# Patient Record
Sex: FEMALE | Race: WHITE | Employment: FULL TIME | URBAN - NONMETROPOLITAN AREA
[De-identification: names, ages, dates, MRNs, and addresses within clinical notes are randomized per-mention and may not be internally consistent; named-entity substitution may affect disease eponyms.]

---

## 2018-01-31 ENCOUNTER — TELEPHONE (OUTPATIENT)
Dept: FAMILY MEDICINE CLINIC | Age: 43
End: 2018-01-31

## 2018-01-31 NOTE — TELEPHONE ENCOUNTER
1. Appt time and date. (give directions)       2/2/18 at 0800    2. Arrive 15 min before appt. 3. Please bring all medications to appt. 4. Bring immunization record. (if no record, which immunizations have you had and where?)      Pt informed and verbalized understanding.

## 2018-02-02 ENCOUNTER — OFFICE VISIT (OUTPATIENT)
Dept: FAMILY MEDICINE CLINIC | Age: 43
End: 2018-02-02
Payer: COMMERCIAL

## 2018-02-02 VITALS
DIASTOLIC BLOOD PRESSURE: 74 MMHG | RESPIRATION RATE: 12 BRPM | SYSTOLIC BLOOD PRESSURE: 126 MMHG | WEIGHT: 129.8 LBS | HEIGHT: 62 IN | HEART RATE: 64 BPM | BODY MASS INDEX: 23.89 KG/M2 | TEMPERATURE: 97.9 F

## 2018-02-02 DIAGNOSIS — G44.209 TENSION HEADACHE: ICD-10-CM

## 2018-02-02 PROCEDURE — 99203 OFFICE O/P NEW LOW 30 MIN: CPT | Performed by: NURSE PRACTITIONER

## 2018-02-02 RX ORDER — AMITRIPTYLINE HYDROCHLORIDE 10 MG/1
10 TABLET, FILM COATED ORAL NIGHTLY
Qty: 30 TABLET | Refills: 3 | Status: SHIPPED | OUTPATIENT
Start: 2018-02-02 | End: 2019-07-12 | Stop reason: ALTCHOICE

## 2018-02-02 ASSESSMENT — PATIENT HEALTH QUESTIONNAIRE - PHQ9
1. LITTLE INTEREST OR PLEASURE IN DOING THINGS: 0
SUM OF ALL RESPONSES TO PHQ9 QUESTIONS 1 & 2: 0
2. FEELING DOWN, DEPRESSED OR HOPELESS: 0
SUM OF ALL RESPONSES TO PHQ QUESTIONS 1-9: 0

## 2018-02-02 NOTE — PROGRESS NOTES
Nausea, Vomiting, Diarrhea, Constipation, Heartburn, Blood in stool  Genitourinary:  Difficulty or painful urination, Flank pain, Change in frequency, Urgency  Skin:  Color change, Rash, Itching, Wound  Psychiatric:  Hallucinations, Anxiety, Depression, Suicidal ideation  Hematological:  Enlarged glands, Easy bleeding, Easily bruising  Musculoskeletal:  Joint pain, Back pain, Gait problems, Joint swelling, Myalgias  Neurological:  Dizziness, Headaches, Presyncope, Numbness, Seizures, Tremors  Allergy:  Environmental allergies, Food allergies  Endocrine:  Heat Intolerance, Cold Intolerance, Polydipsia, Polyphagia, Polyuria      PHYSICAL EXAM:  Vitals:    02/02/18 0804   BP: 126/74   Pulse: 64   Resp: 12   Temp: 97.9 °F (36.6 °C)   TempSrc: Oral   Weight: 129 lb 12.8 oz (58.9 kg)   Height: 5' 2\" (1.575 m)     Body mass index is 23.74 kg/m². VS Reviewed  General Appearance: A&O x 3, No acute distress,well developed and well- nourished  Head: normocephalic and atraumatic  Eyes: pupils equal, round, and reactive to light, extraocular eye movements intact, conjunctivae and eye lids without erythema  Neck: supple and non-tender without mass, no thyromegaly or thyroid nodules, no cervical lymphadenopathy  Pulmonary/Chest: clear to auscultation bilaterally- no wheezes, rales or rhonchi, normal air movement, no respiratory distress or retractions  Cardiovascular: S1 and S2 auscultated w/ RRR. No murmurs, rubs, clicks, or gallops, distal pulses intact. Abdomen: soft, non-tender, non-distended, bowl sounds physiologic,  no rebound or guarding, no masses or hernias noted. Liver and spleen without enlargement. Extremities: no cyanosis, clubbing or edema of the lower extremities. +2 PT/DP bilaterally. Musculoskeletal: No joint swelling or gross deformity   Neuro:  Alert, 5/5 strength globally and symmetrically  Psych: Affect appropriate.   Mood normal. Thought process is normal without evidence of depression or

## 2018-03-16 ENCOUNTER — OFFICE VISIT (OUTPATIENT)
Dept: FAMILY MEDICINE CLINIC | Age: 43
End: 2018-03-16
Payer: COMMERCIAL

## 2018-03-16 VITALS
DIASTOLIC BLOOD PRESSURE: 64 MMHG | RESPIRATION RATE: 10 BRPM | SYSTOLIC BLOOD PRESSURE: 110 MMHG | HEART RATE: 84 BPM | WEIGHT: 125 LBS | BODY MASS INDEX: 23 KG/M2 | TEMPERATURE: 98.2 F | HEIGHT: 62 IN

## 2018-03-16 DIAGNOSIS — G43.009 MIGRAINE WITHOUT AURA AND WITHOUT STATUS MIGRAINOSUS, NOT INTRACTABLE: Primary | ICD-10-CM

## 2018-03-16 DIAGNOSIS — G44.209 TENSION HEADACHE: ICD-10-CM

## 2018-03-16 PROCEDURE — 99213 OFFICE O/P EST LOW 20 MIN: CPT | Performed by: NURSE PRACTITIONER

## 2018-03-16 RX ORDER — CLONIDINE HYDROCHLORIDE 0.1 MG/1
0.1 TABLET ORAL
Qty: 60 TABLET | Refills: 1 | Status: SHIPPED | OUTPATIENT
Start: 2018-03-16 | End: 2019-07-12 | Stop reason: SDUPTHER

## 2018-03-16 RX ORDER — PROPRANOLOL HCL 60 MG
60 CAPSULE, EXTENDED RELEASE 24HR ORAL DAILY
Qty: 30 CAPSULE | Refills: 3 | Status: SHIPPED | OUTPATIENT
Start: 2018-03-16 | End: 2018-05-18 | Stop reason: SDUPTHER

## 2018-03-16 NOTE — PROGRESS NOTES
Chief Complaint   Patient presents with    Follow-up     Pt is here for a 6 week f/u for headaches. She states that she is doing the same. She feels sluggish and tired when she takes the medication. History obtained from chart review. SUBJECTIVE:  Kasia Malagon is a 44 y.o. female that presents today for follow up headaches      Reports that she was taking the Elavil for headache prophylaxis, and she felt like groggy, in a fog. She would have mental pauses where she was all dressed and ready to go, and couldn't think what she was doing. She had 2 full blown migraines in the last 6 weeks. The headaches are usually triggered by stress/anxiety. Will usually get numbness/tingling and then the full blown migraine. She has had 3-4 tension headaches in the last 6 weeks. Tension headaches are usually managed by motrin. The tension headaches are generally manageable. She had taken clonidine 0.1 mg before for about 1 year. The clonidine would usually abort the full blown migraine. Headaches    HPI:  Gets the HA since 2005. Stress usually does trigger them. Description of headaches - start at the base of her neck, usually the left side and will go up and over her head  Frequency of Headaches - once a week  Level of disability - if she doesn't take the Excedrin migraine, then she gets numb tongue, tingling in her right hand, bad balance, blindness in the left eye    Currently on prophylactic therapy? No  Taking abortive therapy? Yes - 2 Excedrin migraine, water    Associated Aura? No  Photophobia, Phonophobia? Yes -  Nausea or Vomiting? Yes - If she doesn't take the Excedrin migraine  Recent change in Headaches? No  Do headaches awaken her from sleep?   No    Age/Gender Health Maintenance    Lipid -  needs  DM Screen - needs  Colon Cancer Screening - n/a  Lung Cancer Screening (Age 54 to [de-identified] with 30 pack year hx, current smoker or quit within past 15 years) - n/a    Tetanus - needs  Influenza Vaccine - needs  Pneumonia Vaccine - n/a  Zostavax - n/a     Breast Cancer Screening - n/a  Cervical Cancer Screening - Francisco Zarate CNP, PAP 2015  Osteoporosis Screening - n/a  Chlamydia Screen - n/a    Falls screening - n/a    Current Outpatient Prescriptions   Medication Sig Dispense Refill    cloNIDine (CATAPRES) 0.1 MG tablet Take 1 tablet by mouth once as needed for Other (migraine) 60 tablet 1    propranolol (INDERAL LA) 60 MG extended release capsule Take 1 capsule by mouth daily 30 capsule 3    amitriptyline (ELAVIL) 10 MG tablet Take 1 tablet by mouth nightly 30 tablet 3     No current facility-administered medications for this visit. Orders Placed This Encounter   Medications    cloNIDine (CATAPRES) 0.1 MG tablet     Sig: Take 1 tablet by mouth once as needed for Other (migraine)     Dispense:  60 tablet     Refill:  1    propranolol (INDERAL LA) 60 MG extended release capsule     Sig: Take 1 capsule by mouth daily     Dispense:  30 capsule     Refill:  3         All medications reviewed and reconciled, including OTC and herbal medications. Updated list given to patient. Patient Active Problem List   Diagnosis    Allergic rhinitis    Tension headache       Past Medical History:   Diagnosis Date    Tension headache 2/2/2018       Past Surgical History:   Procedure Laterality Date    MOUTH SURGERY  6/2009    TUBAL LIGATION  2000       No Known Allergies    Social History     Social History    Marital status:      Spouse name: N/A    Number of children: N/A    Years of education: N/A     Occupational History    Not on file.      Social History Main Topics    Smoking status: Former Smoker     Quit date: 6/28/2009    Smokeless tobacco: Never Used    Alcohol use Yes      Comment: socially     Drug use: No    Sexual activity: Yes     Partners: Male     Birth control/ protection: Surgical     Other Topics Concern    Not on file     Social History Narrative    No narrative on file Family History   Problem Relation Age of Onset    Cancer Mother      breast cancer    Diabetes Mother      type 2    Diabetes Father      type 2    Cancer Sister      breast cancer         I have reviewed the patient's past medical history, past surgical history, allergies, medications, social and family history and I have made updates where appropriate. Review of Systems  Positive responses are highlighted in bold    Constitutional:  Fever, Chills, Night Sweats, Fatigue, Unexpected changes in weight  Eyes:  Eye discharge, Eye pain, Eye redness, Visual disturbances   HENT:  Ear pain, Tinnitus, Nosebleeds, Trouble swallowing, Hearing loss, Sore throat  Cardiovascular:  Chest Pain, Palpitations, Orthopnea, Paroxysmal Nocturnal Dyspnea  Respiratory:  Cough, Wheezing, Shortness of breath, Chest tightness, Apnea  Gastrointestinal:  Nausea, Vomiting, Diarrhea, Constipation, Heartburn, Blood in stool  Genitourinary:  Difficulty or painful urination, Flank pain, Change in frequency, Urgency  Skin:  Color change, Rash, Itching, Wound  Psychiatric:  Hallucinations, Anxiety, Depression, Suicidal ideation  Hematological:  Enlarged glands, Easy bleeding, Easily bruising  Musculoskeletal:  Joint pain, Back pain, Gait problems, Joint swelling, Myalgias  Neurological:  Dizziness, Headaches, Presyncope, Numbness, Seizures, Tremors  Allergy:  Environmental allergies, Food allergies  Endocrine:  Heat Intolerance, Cold Intolerance, Polydipsia, Polyphagia, Polyuria      PHYSICAL EXAM:  Vitals:    03/16/18 1009   BP: 110/64   Site: Right Arm   Position: Sitting   Pulse: 84   Resp: 10   Temp: 98.2 °F (36.8 °C)   TempSrc: Oral   Weight: 125 lb (56.7 kg)   Height: 5' 2\" (1.575 m)     Body mass index is 22.86 kg/m².          VS Reviewed  General Appearance: A&O x 3, No acute distress,well developed and well- nourished  Head: normocephalic and atraumatic  Eyes: pupils equal, round, and reactive to light, extraocular eye

## 2018-03-16 NOTE — PROGRESS NOTES
Visit Information    Have you changed or started any medications since your last visit including any over-the-counter medicines, vitamins, or herbal medicines? no   Are you having any side effects from any of your medications? -  no  Have you stopped taking any of your medications? Is so, why? -  Yes- see med list    Have you seen any other physician or provider since your last visit? No  Have you had any other diagnostic tests since your last visit? No  Have you been seen in the emergency room and/or had an admission to a hospital since we last saw you? No  Have you had your routine dental cleaning in the past 6 months? yes - rputine    Have you activated your [employer] account? If not, what are your barriers?  Yes     Patient Care Team:  Karuna Schneider CNP as PCP - General (Family Medicine)  Lyanne Lefort, MD as Consulting Physician (Otolaryngology)    Medical History Review  Past Medical, Family, and Social History reviewed and does contribute to the patient presenting condition    Health Maintenance   Topic Date Due    HIV screen  01/08/1991    DTaP/Tdap/Td vaccine (1 - Tdap) 01/08/1995    Cervical cancer screen  01/08/1997    Lipid screen  01/08/2016    Flu vaccine (1) 09/01/2017

## 2018-05-18 ENCOUNTER — OFFICE VISIT (OUTPATIENT)
Dept: FAMILY MEDICINE CLINIC | Age: 43
End: 2018-05-18
Payer: COMMERCIAL

## 2018-05-18 VITALS
BODY MASS INDEX: 22.82 KG/M2 | DIASTOLIC BLOOD PRESSURE: 72 MMHG | TEMPERATURE: 97.8 F | SYSTOLIC BLOOD PRESSURE: 94 MMHG | WEIGHT: 124 LBS | HEIGHT: 62 IN | HEART RATE: 84 BPM | RESPIRATION RATE: 14 BRPM

## 2018-05-18 DIAGNOSIS — G43.009 MIGRAINE WITHOUT AURA AND WITHOUT STATUS MIGRAINOSUS, NOT INTRACTABLE: ICD-10-CM

## 2018-05-18 PROCEDURE — 99213 OFFICE O/P EST LOW 20 MIN: CPT | Performed by: NURSE PRACTITIONER

## 2018-05-18 RX ORDER — PROPRANOLOL HCL 60 MG
60 CAPSULE, EXTENDED RELEASE 24HR ORAL DAILY
Qty: 90 CAPSULE | Refills: 3 | Status: SHIPPED | OUTPATIENT
Start: 2018-05-18 | End: 2019-07-12 | Stop reason: SDUPTHER

## 2019-07-12 ENCOUNTER — OFFICE VISIT (OUTPATIENT)
Dept: FAMILY MEDICINE CLINIC | Age: 44
End: 2019-07-12
Payer: COMMERCIAL

## 2019-07-12 VITALS
BODY MASS INDEX: 22.47 KG/M2 | SYSTOLIC BLOOD PRESSURE: 118 MMHG | WEIGHT: 119 LBS | HEART RATE: 88 BPM | DIASTOLIC BLOOD PRESSURE: 74 MMHG | TEMPERATURE: 97.7 F | RESPIRATION RATE: 14 BRPM | HEIGHT: 61 IN

## 2019-07-12 DIAGNOSIS — G43.109 MIGRAINE WITH AURA AND WITHOUT STATUS MIGRAINOSUS, NOT INTRACTABLE: ICD-10-CM

## 2019-07-12 DIAGNOSIS — F41.9 ANXIETY: Primary | ICD-10-CM

## 2019-07-12 PROCEDURE — 99214 OFFICE O/P EST MOD 30 MIN: CPT | Performed by: NURSE PRACTITIONER

## 2019-07-12 RX ORDER — CLONAZEPAM 1 MG/1
TABLET ORAL
Qty: 60 TABLET | Refills: 0 | Status: SHIPPED | OUTPATIENT
Start: 2019-07-12 | End: 2022-03-18 | Stop reason: ALTCHOICE

## 2019-07-12 RX ORDER — CLONIDINE HYDROCHLORIDE 0.1 MG/1
0.1 TABLET ORAL
Qty: 60 TABLET | Refills: 1 | Status: SHIPPED | OUTPATIENT
Start: 2019-07-12 | End: 2022-03-18 | Stop reason: SDUPTHER

## 2019-07-12 RX ORDER — PROPRANOLOL HCL 60 MG
60 CAPSULE, EXTENDED RELEASE 24HR ORAL DAILY
Qty: 90 CAPSULE | Refills: 3 | Status: SHIPPED | OUTPATIENT
Start: 2019-07-12 | End: 2021-05-31

## 2019-07-12 ASSESSMENT — PATIENT HEALTH QUESTIONNAIRE - PHQ9
SUM OF ALL RESPONSES TO PHQ QUESTIONS 1-9: 0
SUM OF ALL RESPONSES TO PHQ9 QUESTIONS 1 & 2: 0
1. LITTLE INTEREST OR PLEASURE IN DOING THINGS: 0
2. FEELING DOWN, DEPRESSED OR HOPELESS: 0
SUM OF ALL RESPONSES TO PHQ QUESTIONS 1-9: 0

## 2019-07-12 NOTE — PROGRESS NOTES
Chief Complaint   Patient presents with    Follow-up     headaches, improved       History obtained from chart review. SUBJECTIVE:  Marj Shore is a 38 y.o. female that presents today for follow up headaches    Headaches are doing well. Headaches    HPI:    Description of headaches - start at the base of her neck, usually the left side and will go up and over her head  Frequency of Headaches - once every 1-2 months  Level of disability - if she doesn't take the Excedrin migraine, then she gets numb tongue, tingling in her right hand, bad balance, blindness in the left eye    Currently on prophylactic therapy? Propranolol  Taking abortive therapy? Yes - clonidine    Associated Aura? Yes, sees lights and face  Photophobia, Phonophobia? Yes -  Nausea or Vomiting? Yes - If she doesn't take the Excedrin migraine  Recent change in Headaches? No  Do headaches awaken her from sleep? No    Anxiety     HPI:    Inciting events or triggers for anxiety - work, works for [employer]) and she has been doing investigations and crazy busy and stressed and can't sleep for the past week    Frequency of anxiety - daily  Panic attacks? Yes - daily  Symptoms of panic attacks -  chest pain, difficulty concentrating, racing thoughts, shortness of breath and sweating  Sleep Disturbances? Yes - falling asleep  Impaired concentration? Yes  Substance abuse? No  Suicidal/Homicidal Ideation?  No    Age/Gender Health Maintenance    Lipid - 40  DM Screen - 40  Colon Cancer Screening - 48  Lung Cancer Screening (Age 54 to [de-identified] with 30 pack year hx, current smoker or quit within past 15 years) - n/a    Tetanus - needs  Influenza Vaccine - needs  Pneumonia Vaccine - n/a  Zostavax - n/a     Breast Cancer Screening - 50  Cervical Cancer Screening - Johnny Sparks CNP, PAP 2015  Osteoporosis Screening - n/a  Chlamydia Screen - n/a    Falls screening - n/a    Current Outpatient Medications   Medication Sig Dispense Refill    GINKGO BILOBA PO Take by mouth      cloNIDine (CATAPRES) 0.1 MG tablet Take 1 tablet by mouth once as needed for Other (migraine) 60 tablet 1    propranolol (INDERAL LA) 60 MG extended release capsule Take 1 capsule by mouth daily 90 capsule 3    clonazePAM (KLONOPIN) 1 MG tablet Take 1/2 tablet to 1 full tablet TID prn for anxiety/panic attacks and/or sleep 60 tablet 0     No current facility-administered medications for this visit. Orders Placed This Encounter   Medications    cloNIDine (CATAPRES) 0.1 MG tablet     Sig: Take 1 tablet by mouth once as needed for Other (migraine)     Dispense:  60 tablet     Refill:  1    propranolol (INDERAL LA) 60 MG extended release capsule     Sig: Take 1 capsule by mouth daily     Dispense:  90 capsule     Refill:  3    clonazePAM (KLONOPIN) 1 MG tablet     Sig: Take 1/2 tablet to 1 full tablet TID prn for anxiety/panic attacks and/or sleep     Dispense:  60 tablet     Refill:  0         All medications reviewed and reconciled, including OTC and herbal medications. Updated list given to patient.        Patient Active Problem List   Diagnosis    Allergic rhinitis    Migraine with aura and without status migrainosus, not intractable    Anxiety       Past Medical History:   Diagnosis Date    Tension headache 2/2/2018       Past Surgical History:   Procedure Laterality Date    MOUTH SURGERY  6/2009    TUBAL LIGATION  2000       No Known Allergies    Social History     Socioeconomic History    Marital status:      Spouse name: Not on file    Number of children: Not on file    Years of education: Not on file    Highest education level: Not on file   Occupational History    Not on file   Social Needs    Financial resource strain: Not on file    Food insecurity:     Worry: Not on file     Inability: Not on file    Transportation needs:     Medical: Not on file     Non-medical: Not on file   Tobacco Use    Smoking status: Former Smoker     Last attempt to quit: 6/28/2009 Years since quitting: 10.0    Smokeless tobacco: Never Used   Substance and Sexual Activity    Alcohol use: Yes     Comment: socially     Drug use: No    Sexual activity: Yes     Partners: Male     Birth control/protection: Surgical   Lifestyle    Physical activity:     Days per week: Not on file     Minutes per session: Not on file    Stress: Not on file   Relationships    Social connections:     Talks on phone: Not on file     Gets together: Not on file     Attends Orthodox service: Not on file     Active member of club or organization: Not on file     Attends meetings of clubs or organizations: Not on file     Relationship status: Not on file    Intimate partner violence:     Fear of current or ex partner: Not on file     Emotionally abused: Not on file     Physically abused: Not on file     Forced sexual activity: Not on file   Other Topics Concern    Not on file   Social History Narrative    Not on file       Family History   Problem Relation Age of Onset    Cancer Mother         breast cancer    Diabetes Mother         type 2    Diabetes Father         type 2    Cancer Sister         breast cancer         I have reviewed the patient's past medical history, past surgical history, allergies, medications, social and family history and I have made updates where appropriate.       Review of Systems  Positive responses are highlighted in bold    Constitutional:  Fever, Chills, Night Sweats, Fatigue, Unexpected changes in weight  Eyes:  Eye discharge, Eye pain, Eye redness, Visual disturbances   HENT:  Ear pain, Tinnitus, Nosebleeds, Trouble swallowing, Hearing loss, Sore throat  Cardiovascular:  Chest Pain, Palpitations, Orthopnea, Paroxysmal Nocturnal Dyspnea  Respiratory:  Cough, Wheezing, Shortness of breath, Chest tightness, Apnea  Gastrointestinal:  Nausea, Vomiting, Diarrhea, Constipation, Heartburn, Blood in stool  Genitourinary:  Difficulty or painful urination, Flank pain, Change in

## 2020-02-11 ENCOUNTER — TELEPHONE (OUTPATIENT)
Dept: FAMILY MEDICINE CLINIC | Age: 45
End: 2020-02-11

## 2020-02-11 RX ORDER — OSELTAMIVIR PHOSPHATE 75 MG/1
75 CAPSULE ORAL DAILY
Qty: 10 CAPSULE | Refills: 0 | Status: SHIPPED | OUTPATIENT
Start: 2020-02-11 | End: 2020-02-21

## 2020-02-13 ENCOUNTER — TELEPHONE (OUTPATIENT)
Dept: FAMILY MEDICINE CLINIC | Age: 45
End: 2020-02-13

## 2020-03-12 ENCOUNTER — TELEPHONE (OUTPATIENT)
Dept: FAMILY MEDICINE CLINIC | Age: 45
End: 2020-03-12

## 2020-03-12 ENCOUNTER — HOSPITAL ENCOUNTER (EMERGENCY)
Age: 45
Discharge: HOME | End: 2020-03-12
Payer: COMMERCIAL

## 2020-03-12 VITALS
HEIGHT: 61 IN | SYSTOLIC BLOOD PRESSURE: 126 MMHG | HEART RATE: 88 BPM | TEMPERATURE: 98.6 F | DIASTOLIC BLOOD PRESSURE: 95 MMHG | RESPIRATION RATE: 16 BRPM | OXYGEN SATURATION: 99 % | BODY MASS INDEX: 22.48 KG/M2

## 2020-03-12 LAB
FLU A ANTIGEN: NEGATIVE
FLU B ANTIGEN: NEGATIVE
GROUP A STREP CULTURE, REFLEX: NEGATIVE
REFLEX THROAT C + S: NORMAL

## 2020-03-12 PROCEDURE — 87804 INFLUENZA ASSAY W/OPTIC: CPT

## 2020-03-12 PROCEDURE — 99283 EMERGENCY DEPT VISIT LOW MDM: CPT

## 2020-03-12 PROCEDURE — 87880 STREP A ASSAY W/OPTIC: CPT

## 2020-03-12 PROCEDURE — 87070 CULTURE OTHR SPECIMN AEROBIC: CPT

## 2020-03-12 ASSESSMENT — ENCOUNTER SYMPTOMS
BACK PAIN: 0
TROUBLE SWALLOWING: 0
SHORTNESS OF BREATH: 1
RHINORRHEA: 1
NAUSEA: 0
SORE THROAT: 1
COUGH: 1
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
## 2020-03-14 LAB — THROAT/NOSE CULTURE: NORMAL

## 2021-02-03 ENCOUNTER — HOSPITAL ENCOUNTER (EMERGENCY)
Age: 46
Discharge: HOME | End: 2021-02-03
Payer: COMMERCIAL

## 2021-02-03 VITALS
HEART RATE: 99 BPM | SYSTOLIC BLOOD PRESSURE: 145 MMHG | TEMPERATURE: 98.7 F | OXYGEN SATURATION: 100 % | DIASTOLIC BLOOD PRESSURE: 95 MMHG | RESPIRATION RATE: 20 BRPM

## 2021-02-03 DIAGNOSIS — S09.90XA CLOSED HEAD INJURY, INITIAL ENCOUNTER: ICD-10-CM

## 2021-02-03 DIAGNOSIS — S00.411A EAR ABRASION, RIGHT, INITIAL ENCOUNTER: ICD-10-CM
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
## 2021-02-03 PROCEDURE — 6370000000 HC RX 637 (ALT 250 FOR IP): Performed by: EMERGENCY MEDICINE

## 2021-02-03 PROCEDURE — 99284 EMERGENCY DEPT VISIT MOD MDM: CPT

## 2021-02-03 RX ORDER — ACETAMINOPHEN 325 MG/1
650 TABLET ORAL ONCE
Status: COMPLETED | OUTPATIENT
Start: 2021-02-03 | End: 2021-02-03

## 2021-02-03 RX ADMIN — ACETAMINOPHEN 650 MG: 325 TABLET ORAL at 15:37
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
## 2021-05-31 ENCOUNTER — HOSPITAL ENCOUNTER (EMERGENCY)
Age: 46
Discharge: HOME | End: 2021-05-31
Payer: COMMERCIAL

## 2021-05-31 VITALS
OXYGEN SATURATION: 100 % | TEMPERATURE: 98 F | HEART RATE: 83 BPM | SYSTOLIC BLOOD PRESSURE: 171 MMHG | DIASTOLIC BLOOD PRESSURE: 75 MMHG | RESPIRATION RATE: 16 BRPM | BODY MASS INDEX: 22.08 KG/M2 | WEIGHT: 120 LBS | HEIGHT: 62 IN

## 2021-05-31 DIAGNOSIS — Z20.822 ENCOUNTER FOR LABORATORY TESTING FOR COVID-19 VIRUS: ICD-10-CM

## 2021-05-31 DIAGNOSIS — J06.9 VIRAL URI: Primary | ICD-10-CM

## 2021-05-31 DIAGNOSIS — R43.2 ALTERED TASTE: ICD-10-CM

## 2021-05-31 DIAGNOSIS — R43.9 SENSE OF SMELL ALTERED: ICD-10-CM

## 2021-05-31 LAB — SARS-COV-2, NAA: NOT DETECTED

## 2021-05-31 PROCEDURE — 99213 OFFICE O/P EST LOW 20 MIN: CPT

## 2021-05-31 PROCEDURE — 99213 OFFICE O/P EST LOW 20 MIN: CPT | Performed by: NURSE PRACTITIONER

## 2021-05-31 PROCEDURE — 87635 SARS-COV-2 COVID-19 AMP PRB: CPT

## 2021-05-31 ASSESSMENT — PAIN SCALES - GENERAL: PAINLEVEL_OUTOF10: 5

## 2021-05-31 ASSESSMENT — ENCOUNTER SYMPTOMS
EYE REDNESS: 0
NAUSEA: 0
EYE DISCHARGE: 0
COUGH: 0
DIARRHEA: 0
SORE THROAT: 0
TROUBLE SWALLOWING: 0
RHINORRHEA: 1
SHORTNESS OF BREATH: 0
VOMITING: 0

## 2021-05-31 ASSESSMENT — PAIN DESCRIPTION - PROGRESSION: CLINICAL_PROGRESSION: GRADUALLY WORSENING

## 2021-05-31 ASSESSMENT — PAIN DESCRIPTION - DESCRIPTORS: DESCRIPTORS: ACHING

## 2021-05-31 ASSESSMENT — PAIN DESCRIPTION - LOCATION: LOCATION: GENERALIZED

## 2021-05-31 ASSESSMENT — PAIN DESCRIPTION - PAIN TYPE: TYPE: ACUTE PAIN

## 2021-05-31 ASSESSMENT — PAIN DESCRIPTION - FREQUENCY: FREQUENCY: CONTINUOUS

## 2021-05-31 ASSESSMENT — PAIN DESCRIPTION - ONSET: ONSET: ON-GOING

## 2021-05-31 NOTE — ED NOTES
Discharge instructions  reviewed with pt. Pt verbalized understanding. Pt ambulated out in stable condition.       Lloyd Boateng RN  05/31/21 2070

## 2021-05-31 NOTE — ED PROVIDER NOTES
[id number]       Chief Complaint   Patient presents with    Concern For COVID-19       Nurses Notes reviewed and I agree except as noted in the HPI. HISTORY OF PRESENT ILLNESS   Ezequiel Vu is a 40 y.o. female who presents with complaints of sinus problems. Onset of symptoms today, unchanged. Patient complains of sinus congestion. She also complains of alteration in taste and smell. No travel. Patient exposed to COVID-19 greater than 2 weeks ago. Patient is required to be tested due to symptoms prior to going to work. REVIEW OF SYSTEMS     Review of Systems   Constitutional: Negative for chills, diaphoresis, fatigue and fever. HENT: Positive for congestion and rhinorrhea. Negative for ear pain, sore throat and trouble swallowing. Eyes: Negative for discharge and redness. Respiratory: Negative for cough and shortness of breath. Cardiovascular: Negative for chest pain. Gastrointestinal: Negative for diarrhea, nausea and vomiting. Genitourinary: Negative for decreased urine volume. Musculoskeletal: Negative for neck pain and neck stiffness. Skin: Negative for rash. Neurological: Negative for headaches. Hematological: Negative for adenopathy. Psychiatric/Behavioral: Negative for sleep disturbance. PAST MEDICAL HISTORY         Diagnosis Date    Tension headache 2/2/2018       SURGICAL HISTORY     Patient  has a past surgical history that includes Tubal ligation (2000) and Mouth surgery (6/2009). CURRENT MEDICATIONS       Previous Medications    CLONAZEPAM (KLONOPIN) 1 MG TABLET    Take 1/2 tablet to 1 full tablet TID prn for anxiety/panic attacks and/or sleep    CLONIDINE (CATAPRES) 0.1 MG TABLET    Take 1 tablet by mouth once as needed for Other (migraine)    GINKGO BILOBA PO    Take by mouth       ALLERGIES     Patient is has No Known Allergies.     FAMILY HISTORY     Patient'sfamily history includes Cancer in her mother and sister; Diabetes in her father and mother. SOCIAL HISTORY     Patient  reports that she quit smoking about 11 years ago. She has never used smokeless tobacco. She reports current alcohol use. She reports that she does not use drugs. PHYSICAL EXAM     ED TRIAGE VITALS  BP: (!) 171/75, Temp: 98 °F (36.7 °C), Pulse: 83, Resp: 16, SpO2: 100 %  Physical Exam  Vitals and nursing note reviewed. Constitutional:       General: She is not in acute distress. Appearance: Normal appearance. She is well-developed. She is not ill-appearing, toxic-appearing or diaphoretic. HENT:      Head: Normocephalic and atraumatic. Right Ear: Hearing, tympanic membrane, ear canal and external ear normal. No mastoid tenderness. No hemotympanum. Tympanic membrane is not perforated, erythematous or bulging. Left Ear: Hearing, tympanic membrane, ear canal and external ear normal. No mastoid tenderness. No hemotympanum. Tympanic membrane is not perforated, erythematous or bulging. Nose: Congestion present. No rhinorrhea. Mouth/Throat:      Mouth: Mucous membranes are moist.      Pharynx: Oropharynx is clear. Uvula midline. Tonsils: No tonsillar abscesses. Eyes:      General: No scleral icterus. Conjunctiva/sclera: Conjunctivae normal.      Right eye: Right conjunctiva is not injected. No hemorrhage. Left eye: Left conjunctiva is not injected. No hemorrhage. Neck:      Thyroid: No thyromegaly. Trachea: Trachea normal.   Cardiovascular:      Rate and Rhythm: Normal rate and regular rhythm. No extrasystoles are present. Chest Wall: PMI is not displaced. Heart sounds: Normal heart sounds. No murmur heard. No friction rub. No gallop. Pulmonary:      Effort: Pulmonary effort is normal. No respiratory distress. Breath sounds: Normal breath sounds. Musculoskeletal:      Cervical back: Normal range of motion and neck supple.    Lymphadenopathy:      Head: Right side of head: No submental, submandibular, tonsillar or occipital adenopathy. Left side of head: No submental, submandibular, tonsillar or occipital adenopathy. Cervical: No cervical adenopathy. Upper Body:      Right upper body: No supraclavicular adenopathy. Left upper body: No supraclavicular adenopathy. Skin:     General: Skin is warm and dry. Capillary Refill: Capillary refill takes less than 2 seconds. Coloration: Skin is not pale. Findings: No rash. Comments: Skin warm and dry to touch, no rashes noted on exposed surfaces. Neurological:      Mental Status: She is alert and oriented to person, place, and time. She is not disoriented. Psychiatric:         Mood and Affect: Mood normal.         Behavior: Behavior is cooperative. DIAGNOSTIC RESULTS   Labs:   Results for orders placed or performed during the hospital encounter of 05/31/21   COVID-19   Result Value Ref Range    SARS-CoV-2, MILLI Not Detected NOT DETECTED       IMAGING:  No orders to display     URGENT CARE COURSE:     Vitals:    05/31/21 1653   BP: (!) 171/75   Pulse: 83   Resp: 16   Temp: 98 °F (36.7 °C)   TempSrc: Temporal   SpO2: 100%   Weight: 120 lb (54.4 kg)   Height: 5' 2\" (1.575 m)       Medications - No data to display  PROCEDURES:  None  FINALIMPRESSION      1. Viral URI    2. Altered taste    3. Sense of smell altered    4. Encounter for laboratory testing for COVID-19 virus        DISPOSITION/PLAN   DISPOSITION Decision To Discharge 05/31/2021 05:24:52 PM  COVID-19 negative. Consistent with viral URI. Treat symptomatically. If symptoms worsen return or go to ER. PATIENT REFERRED TO:  SEB Sun - CNP  [address]  [address] [address] [phone]      Follow up as needed. OTC meds for sinus congestion. If worse return or go to ER.     DISCHARGE MEDICATIONS:  New Prescriptions    No medications on file     Current Discharge Medication List          Jacklyn Moya

## 2022-03-02 ENCOUNTER — HOSPITAL ENCOUNTER (OUTPATIENT)
Age: 47
Discharge: HOME | End: 2022-03-02
Payer: COMMERCIAL

## 2022-03-02 LAB
ALBUMIN SERPL-MCNC: 4.2 G/DL (ref 3.5–5.1)
ALP BLD-CCNC: 63 U/L (ref 38–126)
ALT SERPL-CCNC: 8 U/L (ref 11–66)
ANION GAP SERPL CALCULATED.3IONS-SCNC: 10 MEQ/L (ref 8–16)
AST SERPL-CCNC: 14 U/L (ref 5–40)
AVERAGE GLUCOSE: 99 MG/DL (ref 70–126)
BILIRUB SERPL-MCNC: 0.3 MG/DL (ref 0.3–1.2)
BUN BLDV-MCNC: 9 MG/DL (ref 7–22)
CALCIUM SERPL-MCNC: 9 MG/DL (ref 8.5–10.5)
CHLORIDE BLD-SCNC: 103 MEQ/L (ref 98–111)
CO2: 22 MEQ/L (ref 23–33)
CREAT SERPL-MCNC: 0.6 MG/DL (ref 0.4–1.2)
ERYTHROCYTE [DISTWIDTH] IN BLOOD BY AUTOMATED COUNT: 13.6 % (ref 11.5–14.5)
ERYTHROCYTE [DISTWIDTH] IN BLOOD BY AUTOMATED COUNT: 42.1 FL (ref 35–45)
GFR SERPL CREATININE-BSD FRML MDRD: > 90 ML/MIN/1.73M2
GLUCOSE BLD-MCNC: 95 MG/DL (ref 70–108)
HBA1C MFR BLD: 5.3 % (ref 4.4–6.4)
HCT VFR BLD CALC: 37.1 % (ref 37–47)
HEMOGLOBIN: 11.6 GM/DL (ref 12–16)
HEPATITIS B SURFACE ANTIGEN: NEGATIVE
HEPATITIS C ANTIBODY: NEGATIVE
HIV AG/AB: NONREACTIVE
INSULIN, RANDOM OR FASTING: 5.9 MU/L
MCH RBC QN AUTO: 26.4 PG (ref 26–33)
MCHC RBC AUTO-ENTMCNC: 31.3 GM/DL (ref 32.2–35.5)
MCV RBC AUTO: 84.3 FL (ref 81–99)
PLATELET # BLD: 305 THOU/MM3 (ref 130–400)
PMV BLD AUTO: 9.6 FL (ref 9.4–12.4)
POTASSIUM SERPL-SCNC: 4 MEQ/L (ref 3.5–5.2)
RBC # BLD: 4.4 MILL/MM3 (ref 4.2–5.4)
SODIUM BLD-SCNC: 135 MEQ/L (ref 135–145)
TOTAL PROTEIN: 7.1 G/DL (ref 6.1–8)
TSH SERPL DL<=0.05 MIU/L-ACNC: 1.33 UIU/ML (ref 0.4–4.2)
WBC # BLD: 7.7 THOU/MM3 (ref 4.8–10.8)

## 2022-03-02 PROCEDURE — 87340 HEPATITIS B SURFACE AG IA: CPT

## 2022-03-02 PROCEDURE — 85027 COMPLETE CBC AUTOMATED: CPT

## 2022-03-02 PROCEDURE — 86803 HEPATITIS C AB TEST: CPT

## 2022-03-02 PROCEDURE — 80053 COMPREHEN METABOLIC PANEL: CPT

## 2022-03-02 PROCEDURE — 83525 ASSAY OF INSULIN: CPT

## 2022-03-02 PROCEDURE — 87389 HIV-1 AG W/HIV-1&-2 AB AG IA: CPT

## 2022-03-02 PROCEDURE — 36415 COLL VENOUS BLD VENIPUNCTURE: CPT

## 2022-03-02 PROCEDURE — 83036 HEMOGLOBIN GLYCOSYLATED A1C: CPT

## 2022-03-02 PROCEDURE — 84443 ASSAY THYROID STIM HORMONE: CPT

## 2022-03-02 PROCEDURE — 86592 SYPHILIS TEST NON-TREP QUAL: CPT

## 2022-03-03 LAB — RPR: NONREACTIVE

## 2022-03-18 ENCOUNTER — OFFICE VISIT (OUTPATIENT)
Dept: FAMILY MEDICINE CLINIC | Age: 47
End: 2022-03-18
Payer: COMMERCIAL

## 2022-03-18 ENCOUNTER — TELEPHONE (OUTPATIENT)
Dept: FAMILY MEDICINE CLINIC | Age: 47
End: 2022-03-18

## 2022-03-18 VITALS
DIASTOLIC BLOOD PRESSURE: 70 MMHG | HEART RATE: 91 BPM | SYSTOLIC BLOOD PRESSURE: 122 MMHG | OXYGEN SATURATION: 98 % | TEMPERATURE: 98.3 F | HEIGHT: 62 IN | WEIGHT: 124.4 LBS | BODY MASS INDEX: 22.89 KG/M2 | RESPIRATION RATE: 14 BRPM

## 2022-03-18 DIAGNOSIS — G43.109 MIGRAINE WITH AURA AND WITHOUT STATUS MIGRAINOSUS, NOT INTRACTABLE: Primary | ICD-10-CM

## 2022-03-18 DIAGNOSIS — F41.9 ANXIETY: ICD-10-CM

## 2022-03-18 DIAGNOSIS — G43.109 MIGRAINE WITH AURA AND WITHOUT STATUS MIGRAINOSUS, NOT INTRACTABLE: ICD-10-CM

## 2022-03-18 DIAGNOSIS — Z13.220 SCREENING FOR HYPERLIPIDEMIA: ICD-10-CM

## 2022-03-18 DIAGNOSIS — R19.4 BOWEL HABIT CHANGES: ICD-10-CM

## 2022-03-18 PROCEDURE — 99214 OFFICE O/P EST MOD 30 MIN: CPT | Performed by: NURSE PRACTITIONER

## 2022-03-18 RX ORDER — ALPRAZOLAM 0.5 MG/1
0.5 TABLET ORAL 3 TIMES DAILY PRN
Qty: 6 TABLET | Refills: 0 | Status: SHIPPED | OUTPATIENT
Start: 2022-03-18 | End: 2022-04-17

## 2022-03-18 RX ORDER — CLONIDINE HYDROCHLORIDE 0.1 MG/1
0.1 TABLET ORAL DAILY PRN
Qty: 60 TABLET | Refills: 0 | Status: SHIPPED | OUTPATIENT
Start: 2022-03-18

## 2022-03-18 RX ORDER — ALPRAZOLAM 0.5 MG/1
0.5 TABLET ORAL 3 TIMES DAILY PRN
Qty: 6 TABLET | Refills: 0 | Status: SHIPPED | OUTPATIENT
Start: 2022-03-18 | End: 2022-03-18

## 2022-03-18 RX ORDER — MAGNESIUM GLUCONATE 27 MG(500)
500 TABLET ORAL DAILY
COMMUNITY

## 2022-03-18 RX ORDER — CLONIDINE HYDROCHLORIDE 0.1 MG/1
0.1 TABLET ORAL
Qty: 60 TABLET | Refills: 0 | Status: SHIPPED | OUTPATIENT
Start: 2022-03-18 | End: 2022-03-18

## 2022-03-18 ASSESSMENT — PATIENT HEALTH QUESTIONNAIRE - PHQ9
SUM OF ALL RESPONSES TO PHQ QUESTIONS 1-9: 0
SUM OF ALL RESPONSES TO PHQ9 QUESTIONS 1 & 2: 0
SUM OF ALL RESPONSES TO PHQ QUESTIONS 1-9: 0
2. FEELING DOWN, DEPRESSED OR HOPELESS: 0
SUM OF ALL RESPONSES TO PHQ QUESTIONS 1-9: 0
1. LITTLE INTEREST OR PLEASURE IN DOING THINGS: 0
SUM OF ALL RESPONSES TO PHQ QUESTIONS 1-9: 0

## 2022-03-18 NOTE — PROGRESS NOTES
Chief Complaint   Patient presents with    Discuss Labs     diabetes. would like a meter to check blood sugar    Other     requesting xanax for a flight to [facility]    Migraine     requesting clonidine for this. states she was on before       History obtained from chart review. SUBJECTIVE:  Lavern Goldstein is a 56 y.o. female that presents today for follow up headaches and to review labs    OBGYN ordered labs and had A1C of 5.3. she was told    Headaches    HPI:    Description of headaches - start at the base of her neck, usually the left side and will go up and over her head. Stress and anxiety typically brings them on. Frequency of Headaches - 3 in the last 3 months  Level of disability - if she doesn't take the Excedrin migraine, then she gets numb tongue, tingling in her right hand, bad balance, blindness in the left eye    Currently on prophylactic therapy?  no  Taking abortive therapy? Yes - took Clonidine in the past and it would abort them    Associated Aura? Yes, sees lights and face  Photophobia, Phonophobia? Yes -  Nausea or Vomiting? Yes - If she doesn't take the Excedrin migraine  Recent change in Headaches? No  Do headaches awaken her from sleep? No    Anxiety     Patient is going flying to [place] with her daughters and would like a Rx for Xanax prior to flying    She has also not had a solid BM since Sept. She did get the COVID vaccine in Sept and wonders if this is cause or contributing. She has tried probiotics, yogurt, imodium etc and none of it works. Has also tried changing diet without any improvement.     Age/Gender Health Maintenance    Lipid - 41  DM Screen -   Lab Results   Component Value Date    LABA1C 5.3 03/02/2022     Colon Cancer Screening - 49  Lung Cancer Screening (Age 54 to [de-identified] with 30 pack year hx, current smoker or quit within past 15 years) - n/a    Tetanus - needs  Influenza Vaccine - needs  Pneumonia Vaccine - n/a  Zostavax - n/a     Breast Cancer Screening - 51  Cervical Cancer Screening - Loel Laughter CNP, PAP 2015  Osteoporosis Screening - n/a  Chlamydia Screen - n/a    Falls screening - n/a    Current Outpatient Medications   Medication Sig Dispense Refill    magnesium gluconate (MAGONATE) 500 MG tablet Take 500 mg by mouth daily      BIOTIN PO Take 1 tablet by mouth daily      cloNIDine (CATAPRES) 0.1 MG tablet Take 1 tablet by mouth once as needed for Other (migraine) 60 tablet 0    ALPRAZolam (XANAX) 0.5 MG tablet Take 1 tablet by mouth 3 times daily as needed for Anxiety (flying) for up to 30 days. 6 tablet 0    GINKGO BILOBA PO Take by mouth       No current facility-administered medications for this visit. Orders Placed This Encounter   Medications    cloNIDine (CATAPRES) 0.1 MG tablet     Sig: Take 1 tablet by mouth once as needed for Other (migraine)     Dispense:  60 tablet     Refill:  0    ALPRAZolam (XANAX) 0.5 MG tablet     Sig: Take 1 tablet by mouth 3 times daily as needed for Anxiety (flying) for up to 30 days. Dispense:  6 tablet     Refill:  0         All medications reviewed and reconciled, including OTC and herbal medications. Updated list given to patient.        Patient Active Problem List   Diagnosis    Allergic rhinitis    Migraine with aura and without status migrainosus, not intractable    Anxiety       Past Medical History:   Diagnosis Date    Tension headache 2018       Past Surgical History:   Procedure Laterality Date    MOUTH SURGERY  2009    TUBAL LIGATION  2000       No Known Allergies    Social History     Socioeconomic History    Marital status:      Spouse name: Not on file    Number of children: Not on file    Years of education: Not on file    Highest education level: Not on file   Occupational History    Not on file   Tobacco Use    Smoking status: Former Smoker     Quit date: 2009     Years since quittin.7    Smokeless tobacco: Never Used   Vaping Use    Vaping Use: Never used Substance and Sexual Activity    Alcohol use: Yes     Comment: socially     Drug use: No    Sexual activity: Yes     Partners: Male     Birth control/protection: Surgical   Other Topics Concern    Not on file   Social History Narrative    Not on file     Social Determinants of Health     Financial Resource Strain:     Difficulty of Paying Living Expenses: Not on file   Food Insecurity:     Worried About Running Out of Food in the Last Year: Not on file    Dax of Food in the Last Year: Not on file   Transportation Needs:     Lack of Transportation (Medical): Not on file    Lack of Transportation (Non-Medical): Not on file   Physical Activity:     Days of Exercise per Week: Not on file    Minutes of Exercise per Session: Not on file   Stress:     Feeling of Stress : Not on file   Social Connections:     Frequency of Communication with Friends and Family: Not on file    Frequency of Social Gatherings with Friends and Family: Not on file    Attends [employer]ism Services: Not on file    Active Member of [address] [place] [address] or Organizations: Not on file    Attends Club or Organization Meetings: Not on file    Marital Status: Not on file   Intimate Partner Violence:     Fear of Current or Ex-Partner: Not on file    Emotionally Abused: Not on file    Physically Abused: Not on file    Sexually Abused: Not on file   Housing Stability:     Unable to Pay for Housing in the Last Year: Not on file    Number of [place] in the Last Year: Not on file    Unstable Housing in the Last Year: Not on file       Family History   Problem Relation Age of Onset    Cancer Mother         breast cancer    Diabetes Mother         type 2    Diabetes Father         type 2    Cancer Sister         breast cancer         I have reviewed the patient's past medical history, past surgical history, allergies, medications, social and family history and I have made updates where appropriate.       Review of Systems  Positive responses are highlighted in bold    Constitutional:  Fever, Chills, Night Sweats, Fatigue, Unexpected changes in weight  Eyes:  Eye discharge, Eye pain, Eye redness, Visual disturbances   HENT:  Ear pain, Tinnitus, Nosebleeds, Trouble swallowing, Hearing loss, Sore throat  Cardiovascular:  Chest Pain, Palpitations, Orthopnea, Paroxysmal Nocturnal Dyspnea  Respiratory:  Cough, Wheezing, Shortness of breath, Chest tightness, Apnea  Gastrointestinal:  Nausea, Vomiting, Diarrhea, Constipation, Heartburn, Blood in stool  Genitourinary:  Difficulty or painful urination, Flank pain, Change in frequency, Urgency  Skin:  Color change, Rash, Itching, Wound  Psychiatric:  Hallucinations, Anxiety, Depression, Suicidal ideation  Hematological:  Enlarged glands, Easy bleeding, Easily bruising  Musculoskeletal:  Joint pain, Back pain, Gait problems, Joint swelling, Myalgias  Neurological:  Dizziness, Headaches, Presyncope, Numbness, Seizures, Tremors  Allergy:  Environmental allergies, Food allergies  Endocrine:  Heat Intolerance, Cold Intolerance, Polydipsia, Polyphagia, Polyuria      PHYSICAL EXAM:  Vitals:    03/18/22 0757   BP: 122/70   Pulse: 91   Resp: 14   Temp: 98.3 °F (36.8 °C)   TempSrc: Oral   SpO2: 98%   Weight: 124 lb 6.4 oz (56.4 kg)   Height: 5' 2\" (1.575 m)     Body mass index is 22.75 kg/m². VS Reviewed  General Appearance: A&O x 3, No acute distress,well developed and well- nourished  Head: normocephalic and atraumatic  Eyes: pupils equal, round, and reactive to light, extraocular eye movements intact, conjunctivae and eye lids without erythema  Neck: supple and non-tender without mass, no thyromegaly or thyroid nodules, no cervical lymphadenopathy  Pulmonary/Chest: clear to auscultation bilaterally- no wheezes, rales or rhonchi, normal air movement, no respiratory distress or retractions  Cardiovascular: S1 and S2 auscultated w/ RRR. No murmurs, rubs, clicks, or gallops, distal pulses intact.   Abdomen: soft, non-tender, non-distended, bowl sounds physiologic,  no rebound or guarding, no masses or hernias noted. Liver and spleen without enlargement. Extremities: no cyanosis, clubbing or edema of the lower extremities  Musculoskeletal: No joint swelling or gross deformity   Neuro:  Alert, 5/5 strength globally and symmetrically  Psych: Affect and mood are stressed and anxious. Thought process is normal but speech is rapid and pressured. Good insight and appropriate interaction. Cognition and memory appear to be intact. Skin: warm and dry, no rash or erythema  Lymph:  No cervical, auricular or supraclavicular lymph nodes palpated    ASSESSMENT & PLAN  Eddie Keene was seen today for discuss labs, other and migraine. Diagnoses and all orders for this visit:    Migraine with aura and without status migrainosus, not intractable  -     cloNIDine (CATAPRES) 0.1 MG tablet; Take 1 tablet by mouth once as needed for Other (migraine)    Anxiety  -     ALPRAZolam (XANAX) 0.5 MG tablet; Take 1 tablet by mouth 3 times daily as needed for Anxiety (flying) for up to 30 days. Screening for hyperlipidemia  -     Lipid, Fasting; Future    Bowel habit changes  -     [facility] - Leatha Ambrocio MD, Gastroenterology, [facility]      - med refill Clonidine for migraine abortive  - ok for Xanax prior to flying  - screening cholesterol  - reviewed A1C result with patient, advised she is not diabetic. No need to check sugars etc. Reassurance given  - refer to GI for colonoscopy    Controlled Substance Monitoring:    Acute and Chronic Pain Monitoring:   RX Monitoring 3/18/2022   Periodic Controlled Substance Monitoring No signs of potential drug abuse or diversion identified. DISPOSITION    Return if symptoms worsen or fail to improve. Eddie Keene released without restrictions.       PATIENT COUNSELING    Counseling was provided today regarding the following topics: Healthy eating habits, Regular exercise, substance abuse and healthy sleep habits. Gómez Watt received counseling on the following healthy behaviors: medication adherence    Patient given educational materials on: See Attached    I have instructed Gómez Watt to complete a self tracking handout on none and instructed them to bring it with them to her next appointment. Barriers to learning and self management: none    Discussed use, benefit, and side effects of prescribed medications. Barriers to medication compliance addressed. All patient questions answered. Pt voiced understanding.        Electronically signed by Gabriel Collet, APRN - CNP on 3/18/2022 at 8:40 AM

## 2022-04-19 ENCOUNTER — TELEPHONE (OUTPATIENT)
Dept: FAMILY MEDICINE CLINIC | Age: 47
End: 2022-04-19

## 2022-04-19 NOTE — TELEPHONE ENCOUNTER
Regarding Gastroenterology referral dated 3/18/22    4/19/22  Received fax notification from [facility] that they have had no response from the patient to schedule a consult appt. Called the patient, no answer, lmom to call the office back or send iOculi message letting us know if she is going to call [facility] @[phone] and set up an appt. Per office protocol the referral will be canceled after 30 days with no contact from the patient. 3/21/22  Faxed records to [facility] @[phone] and scanned into media.

## 2022-10-07 NOTE — PROGRESS NOTES
Follow all instructions given by your physician  NPO after midnight   Sips of water am of surgery with allowed medications  Bring insurance info and 's license  Wear comfortable clean, loose fitting clothing  No jewelry or contact lenses to be worn day of surgery  Case for glasses. Shower night before and morning of surgery with a liquid antibacterial soap, dry with fresh clean towel; no lotions, creams or powder. Clean sheets and pillow case on bed night before surgery  Bring medications in original bottles   needed at discharge and someone over 18 to stay with you for 24 hours overnight (surgery may be cancelled if you don't have this)  Report to [facility] on 2nd floor  If you would become ill prior to surgery, please call the surgeon  May have a visitor with you, we request that you limit to 2 visitors in pre-op area  Masks recommended but not required  Call [phone] for any questions  Covid questionnaire Complete; Patient negative for symptoms or exposure. See documentation.  Follow all instructions given by your physician      I

## 2022-10-14 NOTE — H&P
[facility].      Patient Name: Roya Zaragoza   Patient ID: [id number]   Sex: Female   YOB: 1975         Visit Date: 2022    Provider: Elier Rodriguez. Josse Khan DO   Location: [facility] Office   Location Address: Ivette Ojeda [facility], [facility] [address]   Location Phone: [phone]          Chief Complaint   Heavy Periods       History Of Present Illness   The patient presents for a Robotic TLH and BS for Firboid, Pelvic pain, Menorrhagia. The risks of the procedure were reviewed including infection, hemorrhage, and injury to bladder or bowel or ureter. Alternatives to the procedure were also discussed including doing nothing and medical treatments. The procedure was reviewed in detail as well as what to expect postoperatively. All the patient's questions were answered and she demonstrated an understanding of our discussion. This is a 56year old /White female , who presents for the evaluation of heavy periods. She states she has been having problems with her periods for the past 1 year. She indicates the flow has been worsening over the past 6 months. Her last menstrual period was 2022 and lasted for 16 days. Her periods occur approximately every month, but they are lasting 13-16 days, and lots of spotting in between, usually only getting a couple of days without having any spotting/bleeding. With episodes of hemorrhaging per pt, has to wear an adult diaper, overnight pad and wearing a diva cup, changing these frequently. She flows heavy for approximately 4 days out of each period. She has had a blood count since developing this problem and the results showed mild anemia The patient indicates she has had a pelvic ultrasound which showed a fibroid uterus. Her past medical history is as noted on the facesheet: ADHD, Anxiety, Flushing, Irregular menstruation, Leiomyoma of uterus, Pelvic Pain, and Retroverted uterus.  Current medications: Aygestin oral tablet 5 mg, biotin 10 mg tablet, ferrous sulfate 325 mg (65 mg iron) tablet, magnesium 200 mg tablet, and Probiotic 100 billion cell capsule. The following alleviating factors are noted: Motrin or Aleve The following aggravating factors are noted: timing in her cycle. She has no additional symptoms.          Past Medical History   Disease Name Date Onset Notes   ADHD --  --    Anxiety --  --    Flushing 02/14/2013    Irregular menstruation 02/14/2013    Leiomyoma of uterus 04/08/2022 --    Pelvic Pain 02/14/2013    Retroverted uterus 04/08/2022 --            Past Surgical History   Procedure Name Date Notes   Oral surgery 2001 --    Tubal ligation 01/01/2001 Dr Joe           Medication List   Name Date Started Instructions   Aygestin oral tablet 5 mg 07/08/2022 take 1 tablet (5 mg) by oral route once daily   biotin 10 mg tablet  take 1 tablet by oral route daily   ferrous sulfate 325 mg (65 mg iron) tablet  take 1 tablet (325 mg) by oral route once daily   magnesium 200 mg tablet  take 1 tablet by oral route daily   Probiotic 100 billion cell capsule  take 1 capsule by oral route daily           Allergy List   Allergen Name Date Reaction Notes   Dust Feb 14 2013 12:00AM asthma attack asthma attack   NO KNOWN DRUG ALLERGIES Feb 14 2013 12:00AM --  NO KNOWN DRUG ALLERGIES   No known food allergies Mar 6 2013 12:00AM --  NO KNOWN FOOD ALLERGIES         Allergies Reconciled   Family Medical History   Disease Name Relative/Age Notes   Family history of Brain Cancer Grandmother (paternal)/   --    Family history of breast cancer Mother/   --    Family history of diabetes mellitus Father/  Mother/   --    Family history of Lung Cancer Grandfather (paternal)/  Grandmother (maternal)/   --            Reproductive History   Menstrual   Age Menarche: 16 Cycle Interval(Days): 27 Menses Duration(Days): 5   Last Menstrual Period: 09/09/2022 Method of Birth Control: Tubal Ligation   Pregnancy Summary Total Pregnancies: 4 Full Term: 4 Premature: 0   Ab Induced: 0 Ab Spontaneous: 0 Ectopics: 0   Multiples: 0 Livin   Pregnancy Details    Date GA Hrs Labor Birth Wt Sex Type Delivery Anes? Early Labor?  Comments/ Complications Location   1994    Male Vaginal    [place]   1996    Female Vaginal    [place]   1997    Female Vaginal    [place]   2001    Female Vaginal    [facility] Davila           Social History   Finding Status Start/Stop Quantity Notes   Age of first sexual encounter --  --/-- --  19   Alcohol Light --/-- 2 drinks/week --    Jailene Holley CNP --  --/-- --  --    Declines to answer Domestive Violence history --  --/-- --  --    Denies emotional/verbal abuse --  --/-- --  --    Denies physical abuse --  --/-- --  --     --  --/-- --  --    Lifetime partners --  --/-- --  5   Nurse --  --/-- --  [facility]   Partners in the last 6 months --  --/-- --  5   Tobacco Never --/-- --  2022 -            Immunizations   NameDate Admin Mfg Trade Name Lot Number Route Inj VIS Given VIS Publication   COVID [id number] PFR Pfizer-BioNTech COVID-19 Vaccine  NE NE 2022    Comments:    COVID [id number] PFR Pfizer-BioNTech COVID-19 Vaccine  NE NE 2022    Comments:    InfluenzaRefused 2022 NE Not Entered  NE NE     Comments:            Review of Systems   ConstitutionalDenies : body aches, night sweats, loss of appetite   BreastsDenies : lumps, tenderness, swelling, nipple discharge   CardiovascularDenies : chest pain, irregular heart beats, rapid heart rate, syncope   RespiratoryDenies : shortness of breath, wheezing, cough   GastrointestinalDenies : nausea, vomiting, diarrhea, constipation, blood in stools   GenitourinaryDenies : urgency, frequency, dysuria, incontinence   NeurologicDenies : muscular weakness, incoordination, tingling or numbness   EndocrineDenies : polyuria, polydipsia, cold intolerance, heat intolerance Heme-LymphDenies : easy bleeding, easy bruising, lymph node enlargement or tenderness       Vitals   Date Time BP Position Site L\R Cuff Size HR RR TEMP (F) WT  HT  BMI kg/m2 BSA m2 O2 Sat FR L/min FiO2 HC       09/28/2022 08:00 /80 Sitting       125lbs 16oz 5'  2\" 23.05 1.58               Physical Examination   ConstitutionalAppearance : well-nourished, well developed, alert, in no acute distress   EyesConjunctiva/Eyelids : conjunctiva normal, eyelid appearance normal   Sclera : sclera white   HENTHead and Face :   Head : normocephalic, atraumatic   ChestRespiratory Effort : breathing unlabored   CardiovascularPeripheral Vascular System :   Peripheral Circulation : no edema, no cyanosis   Neurologic/PsychiatricMental Status :   Orientation : grossly oriented to person, place and time   Memory : memory intact   Mood and Affect : mood normal, affect appropriate   Gait and Station : normal gait               Assessment   (1) Leiomyoma of uterus     218.9/D25.9    (2) Retroverted uterus     621.6/N85.4    (3) Menorrhagia with irregular cycle     626.2/N92.1    (4) Tubal ligation status     V26.51/Z98.51        Plan     Robotic TLH with BS   Pt prefers Tylenol #3 instead of Norco

## 2022-10-17 ENCOUNTER — ANESTHESIA (OUTPATIENT)
Dept: OPERATING ROOM | Age: 47
End: 2022-10-17
Payer: COMMERCIAL

## 2022-10-17 ENCOUNTER — ANESTHESIA EVENT (OUTPATIENT)
Dept: OPERATING ROOM | Age: 47
End: 2022-10-17
Payer: COMMERCIAL

## 2022-10-17 ENCOUNTER — HOSPITAL ENCOUNTER (OUTPATIENT)
Age: 47
Setting detail: OUTPATIENT SURGERY
Discharge: HOME | End: 2022-10-17
Attending: OBSTETRICS & GYNECOLOGY | Admitting: OBSTETRICS & GYNECOLOGY
Payer: COMMERCIAL

## 2022-10-17 VITALS
WEIGHT: 125 LBS | OXYGEN SATURATION: 97 % | DIASTOLIC BLOOD PRESSURE: 80 MMHG | RESPIRATION RATE: 18 BRPM | BODY MASS INDEX: 23 KG/M2 | TEMPERATURE: 96.6 F | HEART RATE: 80 BPM | HEIGHT: 62 IN | SYSTOLIC BLOOD PRESSURE: 139 MMHG

## 2022-10-17 DIAGNOSIS — N92.1 MENORRHAGIA WITH IRREGULAR CYCLE: ICD-10-CM

## 2022-10-17 DIAGNOSIS — G89.18 POST-OP PAIN: Primary | ICD-10-CM

## 2022-10-17 DIAGNOSIS — D21.9 LEIOMYOMA: ICD-10-CM

## 2022-10-17 LAB — PREGNANCY, URINE: NEGATIVE

## 2022-10-17 PROCEDURE — 6360000002 HC RX W HCPCS: Performed by: ANESTHESIOLOGY

## 2022-10-17 PROCEDURE — 7100000011 HC PHASE II RECOVERY - ADDTL 15 MIN: Performed by: OBSTETRICS & GYNECOLOGY

## 2022-10-17 PROCEDURE — 2500000003 HC RX 250 WO HCPCS

## 2022-10-17 PROCEDURE — 6360000002 HC RX W HCPCS: Performed by: OBSTETRICS & GYNECOLOGY

## 2022-10-17 PROCEDURE — 88307 TISSUE EXAM BY PATHOLOGIST: CPT

## 2022-10-17 PROCEDURE — 81025 URINE PREGNANCY TEST: CPT

## 2022-10-17 PROCEDURE — S2900 ROBOTIC SURGICAL SYSTEM: HCPCS | Performed by: OBSTETRICS & GYNECOLOGY

## 2022-10-17 PROCEDURE — 7100000000 HC PACU RECOVERY - FIRST 15 MIN: Performed by: OBSTETRICS & GYNECOLOGY

## 2022-10-17 PROCEDURE — 6360000002 HC RX W HCPCS

## 2022-10-17 PROCEDURE — 2580000003 HC RX 258: Performed by: OBSTETRICS & GYNECOLOGY

## 2022-10-17 PROCEDURE — 3600000009 HC SURGERY ROBOT BASE: Performed by: OBSTETRICS & GYNECOLOGY

## 2022-10-17 PROCEDURE — 2580000003 HC RX 258

## 2022-10-17 PROCEDURE — 3700000000 HC ANESTHESIA ATTENDED CARE: Performed by: OBSTETRICS & GYNECOLOGY

## 2022-10-17 PROCEDURE — 3600000019 HC SURGERY ROBOT ADDTL 15MIN: Performed by: OBSTETRICS & GYNECOLOGY

## 2022-10-17 PROCEDURE — 6370000000 HC RX 637 (ALT 250 FOR IP): Performed by: OBSTETRICS & GYNECOLOGY

## 2022-10-17 PROCEDURE — 3700000001 HC ADD 15 MINUTES (ANESTHESIA): Performed by: OBSTETRICS & GYNECOLOGY

## 2022-10-17 PROCEDURE — 7100000010 HC PHASE II RECOVERY - FIRST 15 MIN: Performed by: OBSTETRICS & GYNECOLOGY

## 2022-10-17 PROCEDURE — 2709999900 HC NON-CHARGEABLE SUPPLY: Performed by: OBSTETRICS & GYNECOLOGY

## 2022-10-17 PROCEDURE — 7100000001 HC PACU RECOVERY - ADDTL 15 MIN: Performed by: OBSTETRICS & GYNECOLOGY

## 2022-10-17 RX ORDER — SODIUM CHLORIDE, SODIUM LACTATE, POTASSIUM CHLORIDE, CALCIUM CHLORIDE 600; 310; 30; 20 MG/100ML; MG/100ML; MG/100ML; MG/100ML
INJECTION, SOLUTION INTRAVENOUS SEE ADMIN INSTRUCTIONS
Status: DISCONTINUED | OUTPATIENT
Start: 2022-10-17 | End: 2022-10-17 | Stop reason: HOSPADM

## 2022-10-17 RX ORDER — MIDAZOLAM HYDROCHLORIDE 1 MG/ML
INJECTION INTRAMUSCULAR; INTRAVENOUS PRN
Status: DISCONTINUED | OUTPATIENT
Start: 2022-10-17 | End: 2022-10-17 | Stop reason: SDUPTHER

## 2022-10-17 RX ORDER — SODIUM CHLORIDE 9 MG/ML
INJECTION, SOLUTION INTRAVENOUS PRN
Status: DISCONTINUED | OUTPATIENT
Start: 2022-10-17 | End: 2022-10-17 | Stop reason: SDUPTHER

## 2022-10-17 RX ORDER — LIDOCAINE HYDROCHLORIDE 20 MG/ML
INJECTION, SOLUTION EPIDURAL; INFILTRATION; INTRACAUDAL; PERINEURAL PRN
Status: DISCONTINUED | OUTPATIENT
Start: 2022-10-17 | End: 2022-10-17 | Stop reason: SDUPTHER

## 2022-10-17 RX ORDER — FENTANYL CITRATE 50 UG/ML
INJECTION, SOLUTION INTRAMUSCULAR; INTRAVENOUS PRN
Status: DISCONTINUED | OUTPATIENT
Start: 2022-10-17 | End: 2022-10-17 | Stop reason: SDUPTHER

## 2022-10-17 RX ORDER — PROPOFOL 10 MG/ML
INJECTION, EMULSION INTRAVENOUS PRN
Status: DISCONTINUED | OUTPATIENT
Start: 2022-10-17 | End: 2022-10-17 | Stop reason: SDUPTHER

## 2022-10-17 RX ORDER — ACETAMINOPHEN 500 MG
500 TABLET ORAL DAILY
COMMUNITY

## 2022-10-17 RX ORDER — ONDANSETRON 2 MG/ML
INJECTION INTRAMUSCULAR; INTRAVENOUS PRN
Status: DISCONTINUED | OUTPATIENT
Start: 2022-10-17 | End: 2022-10-17 | Stop reason: SDUPTHER

## 2022-10-17 RX ORDER — CODEINE SULFATE 30 MG/1
30 TABLET ORAL EVERY 4 HOURS PRN
Status: DISCONTINUED | OUTPATIENT
Start: 2022-10-17 | End: 2022-10-17 | Stop reason: HOSPADM

## 2022-10-17 RX ORDER — KETOROLAC TROMETHAMINE 30 MG/ML
INJECTION, SOLUTION INTRAMUSCULAR; INTRAVENOUS PRN
Status: DISCONTINUED | OUTPATIENT
Start: 2022-10-17 | End: 2022-10-17 | Stop reason: SDUPTHER

## 2022-10-17 RX ORDER — ROCURONIUM BROMIDE 10 MG/ML
INJECTION, SOLUTION INTRAVENOUS PRN
Status: DISCONTINUED | OUTPATIENT
Start: 2022-10-17 | End: 2022-10-17 | Stop reason: SDUPTHER

## 2022-10-17 RX ORDER — MORPHINE SULFATE 2 MG/ML
4 INJECTION, SOLUTION INTRAMUSCULAR; INTRAVENOUS
Status: DISCONTINUED | OUTPATIENT
Start: 2022-10-17 | End: 2022-10-17 | Stop reason: HOSPADM

## 2022-10-17 RX ORDER — DEXAMETHASONE SODIUM PHOSPHATE 10 MG/ML
INJECTION, EMULSION INTRAMUSCULAR; INTRAVENOUS PRN
Status: DISCONTINUED | OUTPATIENT
Start: 2022-10-17 | End: 2022-10-17 | Stop reason: SDUPTHER

## 2022-10-17 RX ORDER — ONDANSETRON 2 MG/ML
8 INJECTION INTRAMUSCULAR; INTRAVENOUS EVERY 8 HOURS PRN
Status: DISCONTINUED | OUTPATIENT
Start: 2022-10-17 | End: 2022-10-17 | Stop reason: HOSPADM

## 2022-10-17 RX ORDER — SODIUM CHLORIDE, SODIUM LACTATE, POTASSIUM CHLORIDE, CALCIUM CHLORIDE 600; 310; 30; 20 MG/100ML; MG/100ML; MG/100ML; MG/100ML
INJECTION, SOLUTION INTRAVENOUS CONTINUOUS
Status: DISCONTINUED | OUTPATIENT
Start: 2022-10-17 | End: 2022-10-17 | Stop reason: SDUPTHER

## 2022-10-17 RX ORDER — SODIUM CHLORIDE 0.9 % (FLUSH) 0.9 %
5-40 SYRINGE (ML) INJECTION EVERY 12 HOURS SCHEDULED
Status: DISCONTINUED | OUTPATIENT
Start: 2022-10-17 | End: 2022-10-17 | Stop reason: SDUPTHER

## 2022-10-17 RX ORDER — SODIUM CHLORIDE 9 MG/ML
INJECTION, SOLUTION INTRAVENOUS PRN
Status: DISCONTINUED | OUTPATIENT
Start: 2022-10-17 | End: 2022-10-17 | Stop reason: HOSPADM

## 2022-10-17 RX ORDER — KETOROLAC TROMETHAMINE 30 MG/ML
30 INJECTION, SOLUTION INTRAMUSCULAR; INTRAVENOUS EVERY 6 HOURS
Status: DISCONTINUED | OUTPATIENT
Start: 2022-10-17 | End: 2022-10-17 | Stop reason: HOSPADM

## 2022-10-17 RX ORDER — ACETAMINOPHEN 325 MG/1
650 TABLET ORAL EVERY 4 HOURS PRN
Status: DISCONTINUED | OUTPATIENT
Start: 2022-10-17 | End: 2022-10-17 | Stop reason: HOSPADM

## 2022-10-17 RX ORDER — SODIUM CHLORIDE 0.9 % (FLUSH) 0.9 %
5-40 SYRINGE (ML) INJECTION PRN
Status: DISCONTINUED | OUTPATIENT
Start: 2022-10-17 | End: 2022-10-17 | Stop reason: HOSPADM

## 2022-10-17 RX ORDER — SODIUM CHLORIDE 0.9 % (FLUSH) 0.9 %
5-40 SYRINGE (ML) INJECTION PRN
Status: DISCONTINUED | OUTPATIENT
Start: 2022-10-17 | End: 2022-10-17 | Stop reason: SDUPTHER

## 2022-10-17 RX ORDER — SODIUM CHLORIDE, SODIUM LACTATE, POTASSIUM CHLORIDE, CALCIUM CHLORIDE 600; 310; 30; 20 MG/100ML; MG/100ML; MG/100ML; MG/100ML
INJECTION, SOLUTION INTRAVENOUS CONTINUOUS PRN
Status: DISCONTINUED | OUTPATIENT
Start: 2022-10-17 | End: 2022-10-17 | Stop reason: SDUPTHER

## 2022-10-17 RX ORDER — MORPHINE SULFATE 2 MG/ML
2 INJECTION, SOLUTION INTRAMUSCULAR; INTRAVENOUS
Status: DISCONTINUED | OUTPATIENT
Start: 2022-10-17 | End: 2022-10-17 | Stop reason: HOSPADM

## 2022-10-17 RX ORDER — ACETAMINOPHEN AND CODEINE PHOSPHATE 300; 30 MG/1; MG/1
1 TABLET ORAL EVERY 6 HOURS PRN
Qty: 20 TABLET | Refills: 0 | Status: SHIPPED | OUTPATIENT
Start: 2022-10-17 | End: 2022-10-24

## 2022-10-17 RX ORDER — SODIUM CHLORIDE 0.9 % (FLUSH) 0.9 %
5-40 SYRINGE (ML) INJECTION EVERY 12 HOURS SCHEDULED
Status: DISCONTINUED | OUTPATIENT
Start: 2022-10-17 | End: 2022-10-17 | Stop reason: HOSPADM

## 2022-10-17 RX ORDER — KETOROLAC TROMETHAMINE 30 MG/ML
30 INJECTION, SOLUTION INTRAMUSCULAR; INTRAVENOUS ONCE
Status: DISCONTINUED | OUTPATIENT
Start: 2022-10-17 | End: 2022-10-17 | Stop reason: HOSPADM

## 2022-10-17 RX ADMIN — DEXAMETHASONE SODIUM PHOSPHATE 5 MG: 10 INJECTION, EMULSION INTRAMUSCULAR; INTRAVENOUS at 12:10

## 2022-10-17 RX ADMIN — HYDROMORPHONE HYDROCHLORIDE 0.5 MG: 1 INJECTION, SOLUTION INTRAMUSCULAR; INTRAVENOUS; SUBCUTANEOUS at 14:09

## 2022-10-17 RX ADMIN — HYDROMORPHONE HYDROCHLORIDE 0.5 MG: 1 INJECTION, SOLUTION INTRAMUSCULAR; INTRAVENOUS; SUBCUTANEOUS at 13:55

## 2022-10-17 RX ADMIN — ROCURONIUM BROMIDE 20 MG: 10 INJECTION, SOLUTION INTRAVENOUS at 12:46

## 2022-10-17 RX ADMIN — Medication 4 ML: at 12:05

## 2022-10-17 RX ADMIN — SUGAMMADEX 100 MG: 100 INJECTION, SOLUTION INTRAVENOUS at 13:33

## 2022-10-17 RX ADMIN — ONDANSETRON 4 MG: 2 INJECTION INTRAMUSCULAR; INTRAVENOUS at 13:23

## 2022-10-17 RX ADMIN — FENTANYL CITRATE 50 MCG: 50 INJECTION, SOLUTION INTRAMUSCULAR; INTRAVENOUS at 12:32

## 2022-10-17 RX ADMIN — SODIUM CHLORIDE, POTASSIUM CHLORIDE, SODIUM LACTATE AND CALCIUM CHLORIDE: 600; 310; 30; 20 INJECTION, SOLUTION INTRAVENOUS at 12:03

## 2022-10-17 RX ADMIN — SODIUM CHLORIDE, POTASSIUM CHLORIDE, SODIUM LACTATE AND CALCIUM CHLORIDE: 600; 310; 30; 20 INJECTION, SOLUTION INTRAVENOUS at 10:53

## 2022-10-17 RX ADMIN — PROPOFOL 150 MG: 10 INJECTION, EMULSION INTRAVENOUS at 12:05

## 2022-10-17 RX ADMIN — CEFAZOLIN 2000 MG: 10 INJECTION, POWDER, FOR SOLUTION INTRAVENOUS at 12:08

## 2022-10-17 RX ADMIN — FENTANYL CITRATE 50 MCG: 50 INJECTION, SOLUTION INTRAMUSCULAR; INTRAVENOUS at 12:05

## 2022-10-17 RX ADMIN — CODEINE SULFATE 30 MG: 30 TABLET ORAL at 15:27

## 2022-10-17 RX ADMIN — MIDAZOLAM 2 MG: 1 INJECTION INTRAMUSCULAR; INTRAVENOUS at 12:03

## 2022-10-17 RX ADMIN — ROCURONIUM BROMIDE 50 MG: 10 INJECTION, SOLUTION INTRAVENOUS at 12:05

## 2022-10-17 RX ADMIN — KETOROLAC TROMETHAMINE 15 MG: 30 INJECTION, SOLUTION INTRAMUSCULAR; INTRAVENOUS at 13:27

## 2022-10-17 ASSESSMENT — PAIN DESCRIPTION - LOCATION
LOCATION: ABDOMEN
LOCATION: ABDOMEN

## 2022-10-17 ASSESSMENT — PAIN DESCRIPTION - FREQUENCY: FREQUENCY: CONTINUOUS

## 2022-10-17 ASSESSMENT — PAIN SCALES - GENERAL
PAINLEVEL_OUTOF10: 3
PAINLEVEL_OUTOF10: 8
PAINLEVEL_OUTOF10: 7
PAINLEVEL_OUTOF10: 5
PAINLEVEL_OUTOF10: 0
PAINLEVEL_OUTOF10: 5

## 2022-10-17 ASSESSMENT — PAIN DESCRIPTION - DESCRIPTORS: DESCRIPTORS: ACHING

## 2022-10-17 ASSESSMENT — PAIN DESCRIPTION - PAIN TYPE: TYPE: SURGICAL PAIN

## 2022-10-17 ASSESSMENT — PAIN - FUNCTIONAL ASSESSMENT: PAIN_FUNCTIONAL_ASSESSMENT: ACTIVITIES ARE NOT PREVENTED

## 2022-10-17 ASSESSMENT — PAIN DESCRIPTION - ONSET: ONSET: ON-GOING

## 2022-10-17 NOTE — ANESTHESIA POSTPROCEDURE EVALUATION
Stable    Cardiovascular:  Stable    Hydration:  Adequate    PONV:  Stable    Post-op Pain:  Adequate analgesia    Post-op Assessment:  No apparent anesthetic complications    Additional Follow-Up / Treatment / Comment:  None    Ziggy Shah MD  October 17, 2022   6:11 PM

## 2022-10-17 NOTE — DISCHARGE INSTRUCTIONS
Hysterectomy Discharge Instructions          Home Care    You may shower 2 days after surgery. You may bathe/soak in water/swim in 2 weeks. Keep your incision sites clean. Wash your hands before changing the dressing. Do not douche or put anything in your vagina, such as a tampon, until your doctor tells you otherwise. NO INTERCOURSE UNTIL YOU ARE TOLD OTHERWISE. Diet    While in the hospital, you will progress from intravenous fluid to a normal diet. Eat a high-fiber diet to promote healing and prevent constipation . Drink plenty of fluids. Physical Activity    Ask your doctor when you will be able to return to work. You may drive 2 weeks after surgery if you are off narcotics for pain. Return to your normal activities gradually. Most normal activities, including sex, can be resumed in about six weeks. Take daily walks as tolerated. Avoid heavy lifting and strenuous exercise until your doctor gives you permission to do so, usually 4-6 weeks. If you are discharged with a catheter, you will be instructed on home care and when/how to remove it. BE SURE ALL OF YOUR QUESTIONS ARE ANSWERED BEFORE DISCHARGE. Medications    If you had to stop medicines before the procedure, ask your doctor when you can start again. Medicines often stopped include:   Anti-inflammatory drugs (eg, aspirin )   Blood thinners, like clopidogrel (Plavix) or warfarin (Coumadin)   You will likely go home with a prescription for pain medicine. If you had your ovaries removed with your uterus, you may be given an estrogen supplement. Also, to prevent constipation, your doctor may recommend a stool softener. If you are taking medicines, follow these general guidelines:   Take your medicine as directed. Do not change the amount or the schedule. Do not stop taking them without talking to your doctor. Do not share them. Plan ahead for refills so you do not run out.        Lifestyle Changes    You and your doctor will plan lifestyle changes that will aid in your recovery. Some issues that may affect you include: You will no longer have monthly periods, and you can no longer get pregnant. Birth control is not necessary. If your ovaries are removed, you may experience menopausal symptoms, which can be controlled with medicine. You may have a change in your sexual response. If your uterus has been removed, uterine contractions you may have felt during orgasm will no longer occur. If the ovaries have been removed, vaginal dryness may be a problem. Estrogen can help relieve this. You may enjoy sex more because you no longer feel pain from the condition. If you experience a sense of loss or feel depressed after your surgery, it is important to talk to your doctor about these feelings since they can be treated. Follow-up   Schedule a follow-up appointment as directed by your doctor. If you have had a subtotal hysterectomy (meaning you still have your cervix), continue to have regular Pap smears . If you had this procedure because of cancer, other treatment, such as radiation or hormonal therapy, may be used as well. Call Your Doctor If Any of the Following Occurs   It is important for you to monitor your recovery once you leave the hospital. That way, you can alert your doctor to any problems immediately.  If any of the following occurs, call your doctor:   Signs of infection, including fever and chills   Redness, swelling, increasing pain, excessive bleeding, leakage, or any discharge from the incision site   Incision opens up   Nausea and/or vomiting that you cannot control with the medicines you were given after surgery, or which persist for more than two days after discharge from the hospital   Dizziness or fainting   Cough, shortness of breath, or chest pain   Heavy bleeding   Pain that you cannot control with the medicines you have been given   Pain, burning, urgency or frequency of urination, or persistent bleeding in the urine   Swelling, redness, or pain in your leg   In case of an emergency,  CALL 911  immediately. Follow up 1 week . Call [phone] for appointment.

## 2022-10-17 NOTE — PROGRESS NOTES
Pt returned to [facility] Emergency Room room 17. Vitals and assessment as charted. 0.9 infusing, @600ml to count from PACU. Pt has Jello and water. Family at the bedside. Pt and family verbalized understanding of discharge criteria and call light use. Call light in reach.

## 2022-10-17 NOTE — OP NOTE
Rneato Hicks 61                                    [place], [place]                                   RECORD OF OPERATION       PREOPERATIVE DIAGNOSES:  Menorrhagia, Fibroid uterus     POSTOPERATIVE DIAGNOSES:  Menorrhagia, Fibroid uterus     PROCEDURE:     Robotic assisted TLH with BS     SURGEON:  Dr. Elier Rodriguez. Josse Khan. ANESTHESIA:  General.     ESTIMATED BLOOD LOSS:  50 ml. COMPLICATIONS:  None. SPECIMENS:  Uterus, Cervix, bilateral tubes     FINDINGS:   Enlarged, fibroid uterus, normal tubes and ovaries     PROCEDURE:  After signing informed consent, the patient was taken to the Operating Room and placed in a supine position and given general anesthesia. The patient was then placed in a dorsolithotomy position and prepped and draped in the normal fashion. A velasquez was placed in the bladder to drain urine. Two Johnson retractors were used in the vagina to identify the cervix. The anterior lip of the cervix was grasped with a single-tooth tenaculum. A medium V-care uterine manipulator was placed in the endocervical canal for manipulation of the uterus. All other instruments were removed from the vagina. Attention was placed to the abdomen. An 8 mm incision was made in the umbilicus in the LUQ. An 8 mm Airseal trocar was placed under direct visualization. The abdomen was inflated with CO2 gas. Bilateral lower quadrant 8 mm ports were placed under direct visualization. An 8 mm port was placed on the inferior portion of the umbilicus. The patient was placed in steep trendelenburg. The robot was docked and the console portion was started. Bilateral uteroovarian and round ligaments were identified, cauterized, and transected with electrocautery. The tubes were removed with electrocautery. The anterior and posterior peritoneum were , the uterine arteries were skeletonized, and the vesicouterine peritoneum was disected off the vagina.   The uterine arteries were then cauterized and transected bilaterally with electrocautery. The uterus and cervix were then amputated off the vagina in a circumferential fashion using the Vcare as a guide. The uterus, cervix, and tubes were then delivered thorough the vagina. The pelvis was irrigated and the vaginal cuff was closed with 0-vicryl suture 3 across. Good hemostasis was noted. Bilateral ureters were identified and had good peristalsis. The robot was undocked and all instrument were removed from the abdomen. All skin incisions were closed with 4-0 Vicryl, Mastisol and Steri-Strips. Counts were correct x2. The patient received antibiotics prior to skin incision. The patient was transferred to Recovery in stable condition. Leticia Cole.

## 2022-10-17 NOTE — DISCHARGE SUMMARY
Gynecology Discharge Summary        Pt Name: Agnes Villegas  MRN: [id number] Kimcarolyn #: [de-identified]  YOB: 1975      Reasons for Admission on 10/17/2022  9:46 AM  Leiomyoma [D21.9]  Menorrhagia with irregular cycle [N92.1]  No comment available      PROCEDURE:  Robotic TLH with BS    Operative Complications   none    Pre Operative Labs    Lab Results   Component Value Date/Time    WBC 7.7 03/02/2022 02:54 PM    HGB 11.6 03/02/2022 02:54 PM    HCT 37.1 03/02/2022 02:54 PM     03/02/2022 02:54 PM       Discharge Diagnosis   Fibroid uterus, S/P Robotic TLH with BS    Discharge Information  Current Discharge Medication List        START taking these medications    Details   acetaminophen-codeine (TYLENOL/CODEINE #3) 300-30 MG per tablet Take 1 tablet by mouth every 6 hours as needed for Pain for up to 7 days. Intended supply: 7 days.  Take lowest dose possible to manage pain  Qty: 20 tablet, Refills: 0    Comments: Reduce doses taken as pain becomes manageable  Associated Diagnoses: Post-op pain           CONTINUE these medications which have NOT CHANGED    Details   acetaminophen (TYLENOL) 500 MG tablet Take 500 mg by mouth daily      magnesium gluconate (MAGONATE) 500 MG tablet Take 500 mg by mouth daily      BIOTIN PO Take 1 tablet by mouth nightly      cloNIDine (CATAPRES) 0.1 MG tablet Take 1 tablet by mouth daily as needed for Other (migraine)  Qty: 60 tablet, Refills: 0    Associated Diagnoses: Migraine with aura and without status migrainosus, not intractable      GINKGO BILOBA PO Take by mouth               Diet regular  Condition: Stable  Discharge to:  home  Follow up in 1-2 wks    Patient to be discharged on 10/17/2022

## 2022-10-17 NOTE — PROGRESS NOTES
ADMITTED TO [facility] AND ORIENTED TO UNIT. SCDS ON. FALL AND ALLERGY BANDS ON. PT VERBALIZED APPROVAL FOR FIRST NAME, LAST INITIAL AND PHYSICIAN NAME ON UNIT WHITEBOARD. Mother, Manuel Johnson is with the patient.

## 2022-10-17 NOTE — ANESTHESIA PRE PROCEDURE
Department of Anesthesiology  Preprocedure Note       Name:  Radha Zhang   Age:  56 y.o.  :  1975                                          MRN:  [id number]         Date:  10/17/2022      Surgeon: Bibiana Henderson):  Ashwin Baldwin DO    Procedure: Procedure(s):  Robotic Total Laparoscopic Hysterectomy Bilateral Salpingectomy    Medications prior to admission:   Prior to Admission medications    Medication Sig Start Date End Date Taking?  Authorizing Provider   acetaminophen (TYLENOL) 500 MG tablet Take 500 mg by mouth daily   Yes Historical Provider, MD   magnesium gluconate (MAGONATE) 500 MG tablet Take 500 mg by mouth daily    Historical Provider, MD   BIOTIN PO Take 1 tablet by mouth nightly    Historical Provider, MD   cloNIDine (CATAPRES) 0.1 MG tablet Take 1 tablet by mouth daily as needed for Other (migraine) 3/18/22   SEB Cornejo - CNP   GINKGO BILOBA PO Take by mouth    Historical Provider, MD       Current medications:    Current Facility-Administered Medications   Medication Dose Route Frequency Provider Last Rate Last Admin    lactated ringers infusion   IntraVENous Continuous Ashwin Baldwin  mL/hr at 10/17/22 1053 New Bag at 10/17/22 1053    sodium chloride flush 0.9 % injection 5-40 mL  5-40 mL IntraVENous 2 times per day Ashwin Baldwin DO        sodium chloride flush 0.9 % injection 5-40 mL  5-40 mL IntraVENous PRN Ashwin Baldwin DO        0.9 % sodium chloride infusion   IntraVENous PRN Ashwin Baldwin DO        ondansetron [facility]) injection 8 mg  8 mg IntraVENous Q8H PRN Ashwin Baldwin DO        ketorolac (TORADOL) injection 30 mg  30 mg IntraVENous Once Ashwin Baldwin DO        ceFAZolin (ANCEF) 2000 mg in dextrose 5 % 50 mL IVPB  2,000 mg IntraVENous On Call to [address], DO           Allergies:  No Known Allergies    Problem List:    Patient Active Problem List   Diagnosis Code    Allergic rhinitis J30.9    Migraine with aura and without status migrainosus, not intractable G43. 109    Anxiety F41.9       Past Medical History:        Diagnosis Date    Hx of blood clots 2018    left leg    Reactive airway disease 2021    lots of dust house remodel    Tension headache 2018       Past Surgical History:        Procedure Laterality Date    MOUTH SURGERY  2009    TUBAL LIGATION  2000    VARICOSE VEIN SURGERY Left 2018    Blood clot noted       Social History:    Social History     Tobacco Use    Smoking status: Former     Types: Cigarettes     Quit date: 2009     Years since quittin.3    Smokeless tobacco: Never   Substance Use Topics    Alcohol use: Yes     Alcohol/week: 1.0 standard drink     Types: 1 Glasses of wine per week     Comment: socially                                Counseling given: Not Answered      Vital Signs (Current):   Vitals:    10/07/22 1526 10/17/22 1015   BP:  128/78   Pulse:  72   Resp:  18   Temp:  (!) 96.7 °F (35.9 °C)   TempSrc:  Temporal   SpO2:  100%   Weight: 125 lb (56.7 kg)    Height: 5' 2\" (1.575 m)                                               BP Readings from Last 3 Encounters:   10/17/22 128/78   22 122/70   21 (!) 171/75       NPO Status: Time of last liquid consumption:                         Time of last solid consumption:                         Date of last liquid consumption: 10/16/22                        Date of last solid food consumption: 10/16/22    BMI:   Wt Readings from Last 3 Encounters:   10/07/22 125 lb (56.7 kg)   22 124 lb 6.4 oz (56.4 kg)   21 120 lb (54.4 kg)     Body mass index is 22.86 kg/m².     CBC:   Lab Results   Component Value Date/Time    WBC 7.7 2022 02:54 PM    RBC 4.40 2022 02:54 PM    HGB 11.6 2022 02:54 PM    HCT 37.1 2022 02:54 PM    MCV 84.3 2022 02:54 PM    RDW 13.4 2013 09:36 AM     2022 02:54 PM       CMP:   Lab Results   Component Value Date/Time  03/02/2022 02:54 PM    K 4.0 03/02/2022 02:54 PM     03/02/2022 02:54 PM    CO2 22 03/02/2022 02:54 PM    BUN 9 03/02/2022 02:54 PM    CREATININE 0.6 03/02/2022 02:54 PM    LABGLOM >90 03/02/2022 02:54 PM    GLUCOSE 95 03/02/2022 02:54 PM    PROT 7.1 03/02/2022 02:54 PM    CALCIUM 9.0 03/02/2022 02:54 PM    BILITOT 0.3 03/02/2022 02:54 PM    ALKPHOS 63 03/02/2022 02:54 PM    AST 14 03/02/2022 02:54 PM    ALT 8 03/02/2022 02:54 PM       POC Tests: No results for input(s): POCGLU, POCNA, POCK, POCCL, POCBUN, POCHEMO, POCHCT in the last 72 hours. Coags: No results found for: PROTIME, INR, APTT    HCG (If Applicable):   Lab Results   Component Value Date    PREGTESTUR negative 10/17/2022        ABGs: No results found for: PHART, PO2ART, HFE3SPS, XFX7OCW, BEART, P1GNXVMF     Type & Screen (If Applicable):  No results found for: LABABO, LABRH    Drug/Infectious Status (If Applicable):  Lab Results   Component Value Date/Time    HEPCAB Negative 03/02/2022 02:54 PM       COVID-19 Screening (If Applicable):   Lab Results   Component Value Date/Time    COVID19 Not Detected 05/31/2021 04:58 PM           Anesthesia Evaluation  Patient summary reviewed no history of anesthetic complications:   Airway: Mallampati: II  TM distance: >3 FB   Neck ROM: full  Mouth opening: > = 3 FB   Dental: normal exam         Pulmonary:normal exam                               Cardiovascular:                      Neuro/Psych:   (+) headaches: migraine headaches,             GI/Hepatic/Renal:             Endo/Other:                     Abdominal:             Vascular: Other Findings:           Anesthesia Plan      general     ASA 2       Induction: intravenous. MIPS: Postoperative opioids intended and Prophylactic antiemetics administered. Anesthetic plan and risks discussed with patient and mother.       Plan discussed with CRNA and surgical team.                    Torey Taylor MD   10/17/2022

## 2022-10-17 NOTE — INTERVAL H&P NOTE
[facility]  History and Physical Update    Pt Name: Ravinder Bear  MRN: [id number]  YOB: 1975  Date of evaluation: 10/17/2022    [x] I have examined the patient and reviewed the H&P/Consult and there are no changes to the patient or plans. Discussed again size of fibroid and possibility of it not fitting. Patient wishes to have T3 upon discharge.   [] I have examined the patient and reviewed the H&P/Consult and have noted the following changes:        Austyn Salgado DO, DO  Electronically signed 10/17/2022 at 12:04 PM

## 2022-10-17 NOTE — PROGRESS NOTES

## (undated) DEVICE — VCARE MEDIUM, UTERINE MANIPULATOR, VAGINAL-CERVICAL-AHLUWALIA'S-RETRACTOR-ELEVATOR: Brand: VCARE

## (undated) DEVICE — COLUMN DRAPE

## (undated) DEVICE — ELECTRODE PT RET AD L9FT HI MOIST COND ADH HYDRGEL CORDED

## (undated) DEVICE — ELECTRO LUBE IS A SINGLE PATIENT USE DEVICE THAT IS INTENDED TO BE USED ON ELECTROSURGICAL ELECTRODES TO REDUCE STICKING.: Brand: KEY SURGICAL ELECTRO LUBE

## (undated) DEVICE — SUTURE VCRL + SZ 0 L27IN ABSRB VLT L26MM CT-2 1/2 CIR VCP334H

## (undated) DEVICE — PACK PROCEDURE SURG GYN ROBOTIC

## (undated) DEVICE — AIRSEAL 8 MM ACCESS PORT AND LOW PROFILE OBTURATOR WITH BLADELESS OPTICAL TIP, 120 MM LENGTH: Brand: AIRSEAL

## (undated) DEVICE — TIP COVER ACCESSORY

## (undated) DEVICE — 40595 XL TRENDELENBURG POSITIONING KIT: Brand: 40595 XL TRENDELENBURG POSITIONING KIT

## (undated) DEVICE — CANNULA SEAL

## (undated) DEVICE — VCARE SMALL, UTERINE MANIPULATOR, VAGINAL-CERVICAL-AHLUWALIA'S-RETRACTOR-ELEVATOR: Brand: VCARE

## (undated) DEVICE — BANDAGE ADH W1XL3IN NAT FAB WVN FLX DURABLE N ADH PD SEAL

## (undated) DEVICE — SOLUTION IV 1000ML 0.9% SOD CHL PH 5 INJ USP VIAFLX PLAS

## (undated) DEVICE — ARM DRAPE

## (undated) DEVICE — POSITIONER HD W8XH4XL8.5IN RASPBERRY FOAM SLT

## (undated) DEVICE — GLOVE SURG SZ 65 THK91MIL LTX FREE SYN POLYISOPRENE

## (undated) DEVICE — TRI-LUMEN FILTERED TUBE SET WITH ACTIVATED CHARCOAL FILTER: Brand: AIRSEAL

## (undated) DEVICE — SUTURE VCRL + SZ 4-0 L27IN ABSRB WHT FS-2 3/8 CIR REV CUT VCP422H

## (undated) DEVICE — STRIP,CLOSURE,WOUND,MEDI-STRIP,1/2X4: Brand: MEDLINE

## (undated) DEVICE — VAGINAL PACKING: Brand: DEROYAL